# Patient Record
Sex: FEMALE | Race: WHITE
[De-identification: names, ages, dates, MRNs, and addresses within clinical notes are randomized per-mention and may not be internally consistent; named-entity substitution may affect disease eponyms.]

---
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
## 2018-05-05 ENCOUNTER — HOSPITAL ENCOUNTER (EMERGENCY)
Dept: HOSPITAL 47 - EC | Age: 18
Discharge: HOME | End: 2018-05-05
Payer: COMMERCIAL

## 2018-05-05 VITALS
HEART RATE: 97 BPM | TEMPERATURE: 98.8 F | DIASTOLIC BLOOD PRESSURE: 64 MMHG | SYSTOLIC BLOOD PRESSURE: 137 MMHG | RESPIRATION RATE: 20 BRPM

## 2018-05-05 DIAGNOSIS — N23: ICD-10-CM

## 2018-05-05 DIAGNOSIS — Z79.899: ICD-10-CM

## 2018-05-05 DIAGNOSIS — N20.1: Primary | ICD-10-CM

## 2018-05-05 LAB
ALBUMIN SERPL-MCNC: 4.4 G/DL (ref 3.5–5)
ALP SERPL-CCNC: 108 U/L (ref 38–126)
ALT SERPL-CCNC: 25 U/L (ref 9–52)
AMYLASE SERPL-CCNC: 41 U/L (ref 30–110)
ANION GAP SERPL CALC-SCNC: 16 MMOL/L
AST SERPL-CCNC: 26 U/L (ref 14–36)
BASOPHILS # BLD AUTO: 0.1 K/UL (ref 0–0.2)
BASOPHILS NFR BLD AUTO: 1 %
BUN SERPL-SCNC: 11 MG/DL (ref 7–17)
CALCIUM SPEC-MCNC: 9.7 MG/DL (ref 8.4–10.2)
CHLORIDE SERPL-SCNC: 104 MMOL/L (ref 98–107)
CO2 SERPL-SCNC: 23 MMOL/L (ref 22–30)
EOSINOPHIL # BLD AUTO: 0.1 K/UL (ref 0–0.7)
EOSINOPHIL NFR BLD AUTO: 1 %
ERYTHROCYTE [DISTWIDTH] IN BLOOD BY AUTOMATED COUNT: 5.21 M/UL (ref 3.8–5.4)
ERYTHROCYTE [DISTWIDTH] IN BLOOD: 12.2 % (ref 11.5–15.5)
GLUCOSE SERPL-MCNC: 119 MG/DL (ref 74–99)
HCT VFR BLD AUTO: 42.5 % (ref 34–46)
HGB BLD-MCNC: 14.5 GM/DL (ref 11.4–16)
KETONES UR QL STRIP.AUTO: (no result)
LIPASE SERPL-CCNC: 48 U/L (ref 23–300)
LYMPHOCYTES # SPEC AUTO: 1.6 K/UL (ref 1–4.8)
LYMPHOCYTES NFR SPEC AUTO: 12 %
MCH RBC QN AUTO: 27.8 PG (ref 25–35)
MCHC RBC AUTO-ENTMCNC: 34.1 G/DL (ref 31–37)
MCV RBC AUTO: 81.6 FL (ref 80–100)
MONOCYTES # BLD AUTO: 0.5 K/UL (ref 0–1)
MONOCYTES NFR BLD AUTO: 4 %
NEUTROPHILS # BLD AUTO: 11 K/UL (ref 1.3–7.7)
NEUTROPHILS NFR BLD AUTO: 82 %
PH UR: 6 [PH] (ref 5–8)
PLATELET # BLD AUTO: 296 K/UL (ref 150–450)
POTASSIUM SERPL-SCNC: 3.7 MMOL/L (ref 3.5–5.1)
PROT SERPL-MCNC: 6.9 G/DL (ref 6.3–8.2)
RBC UR QL: 139 /HPF (ref 0–5)
SODIUM SERPL-SCNC: 143 MMOL/L (ref 137–145)
SP GR UR: 1.02 (ref 1–1.03)
SQUAMOUS UR QL AUTO: 1 /HPF (ref 0–4)
UROBILINOGEN UR QL STRIP: 2 MG/DL (ref ?–2)
WBC # BLD AUTO: 13.5 K/UL (ref 4–11)
WBC #/AREA URNS HPF: 2 /HPF (ref 0–5)

## 2018-05-05 PROCEDURE — 81025 URINE PREGNANCY TEST: CPT

## 2018-05-05 PROCEDURE — 96375 TX/PRO/DX INJ NEW DRUG ADDON: CPT

## 2018-05-05 PROCEDURE — 96374 THER/PROPH/DIAG INJ IV PUSH: CPT

## 2018-05-05 PROCEDURE — 74018 RADEX ABDOMEN 1 VIEW: CPT

## 2018-05-05 PROCEDURE — 99284 EMERGENCY DEPT VISIT MOD MDM: CPT

## 2018-05-05 PROCEDURE — 80053 COMPREHEN METABOLIC PANEL: CPT

## 2018-05-05 PROCEDURE — 81001 URINALYSIS AUTO W/SCOPE: CPT

## 2018-05-05 PROCEDURE — 36415 COLL VENOUS BLD VENIPUNCTURE: CPT

## 2018-05-05 PROCEDURE — 83690 ASSAY OF LIPASE: CPT

## 2018-05-05 PROCEDURE — 85025 COMPLETE CBC W/AUTO DIFF WBC: CPT

## 2018-05-05 PROCEDURE — 96361 HYDRATE IV INFUSION ADD-ON: CPT

## 2018-05-05 PROCEDURE — 74176 CT ABD & PELVIS W/O CONTRAST: CPT

## 2018-05-05 PROCEDURE — 82150 ASSAY OF AMYLASE: CPT

## 2018-05-05 NOTE — CT
EXAMINATION TYPE: CT abdomen pelvis wo con

 

DATE OF EXAM: 5/5/2018

 

COMPARISON: NONE

 

HISTORY: Rt flank pain

 

CT DLP: 465.6 mGycm

Automated exposure control for dose reduction was used.

 

FINDINGS: There is some minimal atelectasis at the left lung base. There is no pleural pericardial fl
uid. The heart is not enlarged.

 

Within the abdomen, the liver is mildly prominent measuring 20 cm. 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 
lobe. The spleen and gallbladder are normal.

 

Both adrenal glands are normal.

 

There is a 2 mm calculus in the proximal right ureter with minimal fullness of the right renal pelvis
. There is a 1 to 2 mm nonobstructing calculus in the posterior middle pole calyx of the left kidney.


 

The pancreas is unremarkable.

 

There is no significant retroperitoneal adenopathy.

 

The bladder is normal.

 

The uterus and ovaries are unremarkable.

 

Both large and small bowel appear normal. The appendix is unremarkable.

 

There is no free fluid and no free air.

 

No bony lesion is seen.

 

IMPRESSION: 

1. 2 MM PARTIALLY OBSTRUCTING CALCULUS IN THE PROXIMAL RIGHT URETER.

2. MINIMAL NONOBSTRUCTING NEPHROLITHIASIS INVOLVING THE LEFT KIDNEY.

## 2018-05-05 NOTE — XR
EXAMINATION TYPE: XR KUB , 2 VIEWS

 

DATE OF EXAM ORDERED: 5/5/2018

 

HISTORY: abdominal pain.

 

COMPARISON: None.

 

FINDINGS:  The lung bases are clear.

 

The abdominal gas pattern is normal. There is no evidence of obstruction or free air. No unusual calc
ifications are seen.

 

IMPRESSION: 

 

NORMAL ABDOMEN.

## 2018-05-05 NOTE — ED
General Adult HPI





- General


Chief complaint: Abdominal Pain


Stated complaint: Abdominal Pain


Time Seen by Provider: 05/05/18 07:00


Source: patient, RN notes reviewed


Mode of arrival: ambulatory


Limitations: no limitations





- History of Present Illness


Initial comments: 





This is a 18-year-old female who presents to the emergency department 

complaining of right flank pain.  Patient states he woke up and the light is 

been severe since.  Patient states this made her vomit.  Patient states it's 

making her very restless because of the pain.  Patient denies any diarrhea.  

Patient denies any anterior abdominal pain.  Patient denies any right upper 

quadrant pain.  Patient denies any dysuria hematuria urinary frequency.  

Patient states she's recently had a.  She says weeks pregnant.  Patient denies 

any difficulty breathing shortness of breath.  Patient denies headache patient 

denies numbness weakness.  Patient denies any injury or trauma.





- Related Data


 Home Medications











 Medication  Instructions  Recorded  Confirmed


 


Cetirizine HCl 10 mg PO DAILY 05/05/18 05/05/18








 Previous Rx's











 Medication  Instructions  Recorded


 


Hydrocodone/Acetaminophen [Norco 1 each PO Q4HR PRN #15 tab 05/05/18





5-325]  


 


Ketorolac [Toradol] 10 mg PO Q6HR #15 tab 05/05/18











 Allergies











Allergy/AdvReac Type Severity Reaction Status Date / Time


 


No Known Allergies Allergy   Verified 10/24/14 14:49














Review of Systems


ROS Statement: 


Those systems with pertinent positive or pertinent negative responses have been 

documented in the HPI.





ROS Other: All systems not noted in ROS Statement are negative.





Past Medical History


Past Medical History: No Reported History


History of Any Multi-Drug Resistant Organisms: None Reported


Past Surgical History: Tonsillectomy


Additional Past Surgical History / Comment(s): pe tubes


Past Psychological History: No Psychological Hx Reported


Smoking Status: Never smoker


Past Alcohol Use History: Occasional


Past Drug Use History: None Reported





General Exam





- General Exam Comments


Initial Comments: 





GENERAL:


Patient is well-developed and well-nourished.  Patient is nontoxic and well-

hydrated and is in moderate distress.





ENT:


Neck is soft and supple.  No significant lymphadenopathy is noted.  Oropharynx 

is clear.  Moist mucous membranes.  Neck has full range of motion without 

eliciting any pain.  





EYES:


The sclera were anicteric and conjunctiva were pink and moist.  Extraocular 

movements were intact and pupils were equal round and reactive to light.  

Eyelids were unremarkable.





PULMONARY:


Unlabored respirations.  Good breath sounds bilaterally.  No audible rales 

rhonchi or wheezing was noted.





CARDIOVASCULAR:


There is a regular rate and rhythm without any murmurs gallops or rubs.  





ABDOMEN:


Soft and nontender with normal bowel sounds.  No palpable organomegaly was 

noted.  There is no palpable pulsatile mass.





SKIN:


Skin is clear with no lesions or rashes and otherwise unremarkable.





NEUROLOGIC:


Patient is alert and oriented x3.  Cranial nerves II through XII are grossly 

intact.  Motor and sensory are also intact.  Normal speech, volume and content.

  Symmetrical smile.  





MUSCULOSKELETAL:


Normal extremities with adequate strength and full range of motion.  Patient 

has some right CVA tenderness





LYMPHATICS:


No significant lymphadenopathy is noted





PSYCHIATRIC:


Normal psychiatric evaluation.  


Limitations: no limitations





Course


 Vital Signs











  05/05/18 05/05/18





  06:54 09:24


 


Temperature 97.3 F L 


 


Pulse Rate 81 70


 


Respiratory 20 18





Rate  


 


Blood Pressure 125/77 117/62


 


O2 Sat by Pulse 99 97





Oximetry  














Medical Decision Making





- Medical Decision Making





Computed tomography scan shows a 2 mm proximal ureteral stone.  Patient 

received Toradol in the emergency department and Zofran.





- Lab Data


Result diagrams: 


 05/05/18 08:18





 05/05/18 08:18


 Lab Results











  05/05/18 05/05/18 05/05/18 Range/Units





  08:18 08:18 08:18 


 


WBC   13.5 H   (4.0-11.0)  k/uL


 


RBC   5.21   (3.80-5.40)  m/uL


 


Hgb   14.5   (11.4-16.0)  gm/dL


 


Hct   42.5   (34.0-46.0)  %


 


MCV   81.6   (80.0-100.0)  fL


 


MCH   27.8   (25.0-35.0)  pg


 


MCHC   34.1   (31.0-37.0)  g/dL


 


RDW   12.2   (11.5-15.5)  %


 


Plt Count   296   (150-450)  k/uL


 


Neutrophils %   82   %


 


Lymphocytes %   12   %


 


Monocytes %   4   %


 


Eosinophils %   1   %


 


Basophils %   1   %


 


Neutrophils #   11.0 H   (1.3-7.7)  k/uL


 


Lymphocytes #   1.6   (1.0-4.8)  k/uL


 


Monocytes #   0.5   (0-1.0)  k/uL


 


Eosinophils #   0.1   (0-0.7)  k/uL


 


Basophils #   0.1   (0-0.2)  k/uL


 


Sodium  143    (137-145)  mmol/L


 


Potassium  3.7    (3.5-5.1)  mmol/L


 


Chloride  104    ()  mmol/L


 


Carbon Dioxide  23    (22-30)  mmol/L


 


Anion Gap  16    mmol/L


 


BUN  11    (7-17)  mg/dL


 


Creatinine  0.69    (0.52-1.04)  mg/dL


 


Est GFR (CKD-EPI)AfAm  >90    (>60 ml/min/1.73 sqM)  


 


Est GFR (CKD-EPI)NonAf  >90    (>60 ml/min/1.73 sqM)  


 


Glucose  119 H    (74-99)  mg/dL


 


Calcium  9.7    (8.4-10.2)  mg/dL


 


Total Bilirubin  0.6    (0.2-1.3)  mg/dL


 


AST  26    (14-36)  U/L


 


ALT  25    (9-52)  U/L


 


Alkaline Phosphatase  108    ()  U/L


 


Total Protein  6.9    (6.3-8.2)  g/dL


 


Albumin  4.4    (3.5-5.0)  g/dL


 


Amylase  41    ()  U/L


 


Lipase  48    ()  U/L


 


Urine Color     


 


Urine Appearance     (Clear)  


 


Urine pH     (5.0-8.0)  


 


Ur Specific Gravity     (1.001-1.035)  


 


Urine Protein     (Negative)  


 


Urine Glucose (UA)     (Negative)  


 


Urine Ketones     (Negative)  


 


Urine Blood     (Negative)  


 


Urine Nitrite     (Negative)  


 


Urine Bilirubin     (Negative)  


 


Urine Urobilinogen     (<2.0)  mg/dL


 


Ur Leukocyte Esterase     (Negative)  


 


Urine RBC     (0-5)  /hpf


 


Urine WBC     (0-5)  /hpf


 


Ur Squamous Epith Cells     (0-4)  /hpf


 


Urine Mucus     (None)  /hpf


 


Urine HCG, Qual    Not Detected  (Not Detectd)  














  05/05/18 Range/Units





  08:18 


 


WBC   (4.0-11.0)  k/uL


 


RBC   (3.80-5.40)  m/uL


 


Hgb   (11.4-16.0)  gm/dL


 


Hct   (34.0-46.0)  %


 


MCV   (80.0-100.0)  fL


 


MCH   (25.0-35.0)  pg


 


MCHC   (31.0-37.0)  g/dL


 


RDW   (11.5-15.5)  %


 


Plt Count   (150-450)  k/uL


 


Neutrophils %   %


 


Lymphocytes %   %


 


Monocytes %   %


 


Eosinophils %   %


 


Basophils %   %


 


Neutrophils #   (1.3-7.7)  k/uL


 


Lymphocytes #   (1.0-4.8)  k/uL


 


Monocytes #   (0-1.0)  k/uL


 


Eosinophils #   (0-0.7)  k/uL


 


Basophils #   (0-0.2)  k/uL


 


Sodium   (137-145)  mmol/L


 


Potassium   (3.5-5.1)  mmol/L


 


Chloride   ()  mmol/L


 


Carbon Dioxide   (22-30)  mmol/L


 


Anion Gap   mmol/L


 


BUN   (7-17)  mg/dL


 


Creatinine   (0.52-1.04)  mg/dL


 


Est GFR (CKD-EPI)AfAm   (>60 ml/min/1.73 sqM)  


 


Est GFR (CKD-EPI)NonAf   (>60 ml/min/1.73 sqM)  


 


Glucose   (74-99)  mg/dL


 


Calcium   (8.4-10.2)  mg/dL


 


Total Bilirubin   (0.2-1.3)  mg/dL


 


AST   (14-36)  U/L


 


ALT   (9-52)  U/L


 


Alkaline Phosphatase   ()  U/L


 


Total Protein   (6.3-8.2)  g/dL


 


Albumin   (3.5-5.0)  g/dL


 


Amylase   ()  U/L


 


Lipase   ()  U/L


 


Urine Color  Yellow  


 


Urine Appearance  Clear  (Clear)  


 


Urine pH  6.0  (5.0-8.0)  


 


Ur Specific Gravity  1.019  (1.001-1.035)  


 


Urine Protein  Trace H  (Negative)  


 


Urine Glucose (UA)  Negative  (Negative)  


 


Urine Ketones  3+ H  (Negative)  


 


Urine Blood  Moderate H  (Negative)  


 


Urine Nitrite  Negative  (Negative)  


 


Urine Bilirubin  Negative  (Negative)  


 


Urine Urobilinogen  2.0  (<2.0)  mg/dL


 


Ur Leukocyte Esterase  Negative  (Negative)  


 


Urine RBC  139 H  (0-5)  /hpf


 


Urine WBC  2  (0-5)  /hpf


 


Ur Squamous Epith Cells  1  (0-4)  /hpf


 


Urine Mucus  Occasional H  (None)  /hpf


 


Urine HCG, Qual   (Not Detectd)  














Disposition


Clinical Impression: 


 Renal colic





Disposition: HOME SELF-CARE


Condition: Good


Instructions:  Renal Colic (ED), Kidney Stones (ED)


Prescriptions: 


Hydrocodone/Acetaminophen [Norco 5-325] 1 each PO Q4HR PRN #15 tab


 PRN Reason: Pain


Ketorolac [Toradol] 10 mg PO Q6HR #15 tab


Is patient prescribed a controlled substance at d/c from ED?: Yes


When asked, does pt state using other controlled substances?: No


Referrals: 


Addy Zambrano DO [Primary Care Provider] - 1-2 days


Time of Disposition: 09:31

## 2019-01-17 ENCOUNTER — HOSPITAL ENCOUNTER (OUTPATIENT)
Dept: HOSPITAL 47 - RADCTMAIN | Age: 19
Discharge: HOME | End: 2019-01-17
Attending: FAMILY MEDICINE
Payer: COMMERCIAL

## 2019-01-17 DIAGNOSIS — R51: Primary | ICD-10-CM

## 2019-01-17 DIAGNOSIS — S06.0X0A: ICD-10-CM

## 2019-01-17 PROCEDURE — 70450 CT HEAD/BRAIN W/O DYE: CPT

## 2019-01-17 NOTE — CT
EXAMINATION TYPE: CT brain wo con

 

DATE OF EXAM: 1/17/2019

 

COMPARISON: Prior head CT 11/2/2017

 

HISTORY: Headaches x2 weeks after head injury.

 

CT DLP: 1082 mGycm.  Automated Exposure Control for Dose Reduction was Utilized.

 

 

TECHNIQUE: CT scan of the head is performed without contrast.

 

FINDINGS:   There is no acute intracranial hemorrhage, mass effect, or midline shift identified.  The
 ventricles and sulci are within normal limits in size.  The globes are intact and the visualized sin
uses are remarkable for minimal inflammatory change left maxillary sinus. 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
erebellar tonsillar ectopia.

 

IMPRESSION:  No acute intracranial hemorrhage, mass effect, or midline shift is seen. Additional find
ings above, inferior cerebellar tonsillar ectopia could be confirmed with brain MRI as indicated.

## 2019-09-14 ENCOUNTER — HOSPITAL ENCOUNTER (EMERGENCY)
Dept: HOSPITAL 47 - EC | Age: 19
Discharge: HOME | End: 2019-09-14
Payer: COMMERCIAL

## 2019-09-14 VITALS — TEMPERATURE: 98.6 F | HEART RATE: 60 BPM | DIASTOLIC BLOOD PRESSURE: 65 MMHG | SYSTOLIC BLOOD PRESSURE: 119 MMHG

## 2019-09-14 VITALS — RESPIRATION RATE: 18 BRPM

## 2019-09-14 DIAGNOSIS — Z79.899: ICD-10-CM

## 2019-09-14 DIAGNOSIS — Z79.3: ICD-10-CM

## 2019-09-14 DIAGNOSIS — D72.829: ICD-10-CM

## 2019-09-14 DIAGNOSIS — F32.9: ICD-10-CM

## 2019-09-14 DIAGNOSIS — Z87.442: ICD-10-CM

## 2019-09-14 DIAGNOSIS — R11.0: ICD-10-CM

## 2019-09-14 DIAGNOSIS — F17.200: ICD-10-CM

## 2019-09-14 DIAGNOSIS — R10.9: Primary | ICD-10-CM

## 2019-09-14 DIAGNOSIS — F41.9: ICD-10-CM

## 2019-09-14 LAB
ALBUMIN SERPL-MCNC: 4.3 G/DL (ref 3.5–5)
ALP SERPL-CCNC: 77 U/L (ref 38–126)
ALT SERPL-CCNC: 25 U/L (ref 9–52)
ANION GAP SERPL CALC-SCNC: 11 MMOL/L
AST SERPL-CCNC: 25 U/L (ref 14–36)
BASOPHILS # BLD AUTO: 0.1 K/UL (ref 0–0.2)
BASOPHILS NFR BLD AUTO: 0 %
BUN SERPL-SCNC: 7 MG/DL (ref 7–17)
CALCIUM SPEC-MCNC: 9.3 MG/DL (ref 8.4–10.2)
CHLORIDE SERPL-SCNC: 107 MMOL/L (ref 98–107)
CO2 SERPL-SCNC: 25 MMOL/L (ref 22–30)
EOSINOPHIL # BLD AUTO: 0.2 K/UL (ref 0–0.7)
EOSINOPHIL NFR BLD AUTO: 2 %
ERYTHROCYTE [DISTWIDTH] IN BLOOD BY AUTOMATED COUNT: 4.85 M/UL (ref 3.8–5.4)
ERYTHROCYTE [DISTWIDTH] IN BLOOD: 14.3 % (ref 11.5–15.5)
GLUCOSE SERPL-MCNC: 90 MG/DL (ref 74–99)
HCG SERPL-MCNC: <2.4 MIU/ML
HCT VFR BLD AUTO: 41.3 % (ref 34–46)
HGB BLD-MCNC: 14.1 GM/DL (ref 11.4–16)
LYMPHOCYTES # SPEC AUTO: 2.4 K/UL (ref 1–4.8)
LYMPHOCYTES NFR SPEC AUTO: 17 %
MCH RBC QN AUTO: 29.2 PG (ref 25–35)
MCHC RBC AUTO-ENTMCNC: 34.3 G/DL (ref 31–37)
MCV RBC AUTO: 85.2 FL (ref 80–100)
MONOCYTES # BLD AUTO: 0.5 K/UL (ref 0–1)
MONOCYTES NFR BLD AUTO: 4 %
NEUTROPHILS # BLD AUTO: 10.4 K/UL (ref 1.3–7.7)
NEUTROPHILS NFR BLD AUTO: 75 %
PH UR: 6 [PH] (ref 5–8)
PLATELET # BLD AUTO: 296 K/UL (ref 150–450)
POTASSIUM SERPL-SCNC: 3.8 MMOL/L (ref 3.5–5.1)
PROT SERPL-MCNC: 7.3 G/DL (ref 6.3–8.2)
PROT UR QL: (no result)
RBC UR QL: >182 /HPF (ref 0–5)
SODIUM SERPL-SCNC: 143 MMOL/L (ref 137–145)
SP GR UR: 1.01 (ref 1–1.03)
SQUAMOUS UR QL AUTO: 1 /HPF (ref 0–4)
UROBILINOGEN UR QL STRIP: <2 MG/DL (ref ?–2)
WBC # BLD AUTO: 13.8 K/UL (ref 3.8–10.6)
WBC # UR AUTO: 165 /HPF (ref 0–5)

## 2019-09-14 PROCEDURE — 96375 TX/PRO/DX INJ NEW DRUG ADDON: CPT

## 2019-09-14 PROCEDURE — 96374 THER/PROPH/DIAG INJ IV PUSH: CPT

## 2019-09-14 PROCEDURE — 76770 US EXAM ABDO BACK WALL COMP: CPT

## 2019-09-14 PROCEDURE — 96361 HYDRATE IV INFUSION ADD-ON: CPT

## 2019-09-14 PROCEDURE — 85025 COMPLETE CBC W/AUTO DIFF WBC: CPT

## 2019-09-14 PROCEDURE — 36415 COLL VENOUS BLD VENIPUNCTURE: CPT

## 2019-09-14 PROCEDURE — 81001 URINALYSIS AUTO W/SCOPE: CPT

## 2019-09-14 PROCEDURE — 99284 EMERGENCY DEPT VISIT MOD MDM: CPT

## 2019-09-14 PROCEDURE — 83690 ASSAY OF LIPASE: CPT

## 2019-09-14 PROCEDURE — 87077 CULTURE AEROBIC IDENTIFY: CPT

## 2019-09-14 PROCEDURE — 87186 SC STD MICRODIL/AGAR DIL: CPT

## 2019-09-14 PROCEDURE — 80053 COMPREHEN METABOLIC PANEL: CPT

## 2019-09-14 PROCEDURE — 74018 RADEX ABDOMEN 1 VIEW: CPT

## 2019-09-14 PROCEDURE — 84702 CHORIONIC GONADOTROPIN TEST: CPT

## 2019-09-14 PROCEDURE — 87086 URINE CULTURE/COLONY COUNT: CPT

## 2019-09-14 NOTE — US
EXAMINATION TYPE: US kidneys/renal and bladder

 

DATE OF EXAM: 9/14/2019

 

COMPARISON: NONE

 

CLINICAL HISTORY: Left flank pain. Left flank pain.

 

EXAM MEASUREMENTS:

 

Right Kidney:  9.5 x 3.7 x 3.7  cm

Left Kidney: 11.3 x 5.9 x 5.2  cm

 

Right Kidney: No hydronephrosis or masses seen  

Left Kidney: 3 mm echogenic area seen mid pole.  

Bladder: Not full.

**Bilateral Jets seen: No

 

There is no evidence for hydronephrosis at this point in time.  

 

IMPRESSION:

 

PROBABLE SMALL ANGIOMYOLIPOMA INVOLVING THE MID POLAR REGION OF THE LEFT KIDNEY.

## 2019-09-14 NOTE — ED
General Adult HPI





- General


Chief complaint: Back Pain/Injury


Stated complaint: back/abd pain


Time Seen by Provider: 09/14/19 07:21


Source: patient


Mode of arrival: ambulatory


Limitations: no limitations





- History of Present Illness


Initial comments: 





Dictation was produced using dragon dictation software. please excuse any 

grammatical, word or spelling errors. 





Chief Complaint: 19-year-old female with past medical history of nephrolithiasis

presents with left-sided flank pain.





History of Present Illness: Patient is a 19-year-old female she has past medical

history of nephrolithiasis.  Her first kidney stone was last year.  At that time

she had a 2 mm partially obstructing calculus in the proximal right ureter.  

Patient states that today she began having left-sided flank pain.  She initially

noted the pain while she was sleeping.  Patient states her pain initially 

started in the upper left back and does not radiate down to her lower left back.

 Patient states she is on her menstrual period as well.  She gets sometimes back

pain during her menstrual cycle.  Patient has history of nonobstructing left 

kidney stone seen last year on the CT.  Denies any fever, chills or night 

sweats.  Patient feels mildly nauseated.  She denies being pregnant.








The ROS documented in this emergency department record has been reviewed and 

confirmed by me.  Those systems with pertinent positive or negative responses 

have been documented in the HPI.  All other systems are other negative and/or 

noncontributory.








PHYSICAL EXAM:


General Impression: Alert and oriented x3, not in acute distress


HEENT: Normocephalic atraumatic, extra-ocular movements intact, pupils equal and

reactive to light bilaterally, mucous membranes moist.


Cardiovascular: Heart regular rate and rhythm, S1&S2 audible, no murmurs, rubs 

or gallops


Chest: Lungs clear to auscultation bilaterally, no rhonchi, no wheeze, no rales


Abdomen: Bowel sounds present, abdomen soft, non-tender, non-distended, no 

organomegaly


Musculoskeletal: Pulses present and equal in all extremities, no peripheral 

edema


Motor:  no focal deficits noted


Neurological: CN II-XII grossly intact, no focal motor or sensory deficits noted


Skin: Intact with no visualized rashes


Psych: Normal affect and mood





ED course: 19-year-old female presents with left-sided flank pain.  She has a 

history of kidney stone.  CT from last year showed nonobstructing left kidney 

stone measuring approximately 1-2 mm signs upon arrival are within acceptable 

limits.


Laboratory evaluation obtained.  Mild leukocytosis of 13.8, metabolic panel is 

negative.  Urinalysis shows greater that 182 red blood cells with 165 white 

blood cells.  KUB shows no acute processes.  Abdominal bladder ultrasound shows 

no signs of pyelonephritis.  Patient's symptoms are not entirely colicky.  There

is more concerned of bladder cystitis versus pyelonephritis.  Patient given 1 g 

of ceftriaxone.  Patient reevaluated at bedside found to be stable medical 

condition.  Discussed patient that her clinical presentation is concerning for 

urinary tract infection.  She is given prescription of Keflex to go home with.  

Patient clinically stable to be discharged per she is also given outpatient 

referral to urology for outpatient management of possible kidney stone.  He 

turned parameters discussed.  Patient clear for discharge.








- Related Data


                                Home Medications











 Medication  Instructions  Recorded  Confirmed


 


Cetirizine HCl 10 mg PO HS 05/05/18 09/14/19


 


ALPRAZolam [Xanax] 0.5 mg PO TID PRN 09/14/19 09/14/19


 


Escitalopram Oxalate [Lexapro] 20 mg PO HS 09/14/19 09/14/19


 


Myzilra 1 tab PO HS 09/14/19 09/14/19








                                  Previous Rx's











 Medication  Instructions  Recorded


 


Cephalexin [Keflex] 500 mg PO Q6HR 10 Days #40 cap 09/14/19











                                    Allergies











Allergy/AdvReac Type Severity Reaction Status Date / Time


 


No Known Allergies Allergy   Verified 09/14/19 07:48














Review of Systems


ROS Statement: 


Those systems with pertinent positive or pertinent negative responses have been 

documented in the HPI.





ROS Other: All systems not noted in ROS Statement are negative.





Past Medical History


Past Medical History: No Reported History


History of Any Multi-Drug Resistant Organisms: None Reported


Past Surgical History: Tonsillectomy


Additional Past Surgical History / Comment(s): pe tubes


Past Psychological History: Anxiety, Depression


Smoking Status: Current every day smoker


Past Alcohol Use History: Occasional


Past Drug Use History: Marijuana





General Exam


Limitations: no limitations





Course


                                   Vital Signs











  09/14/19 09/14/19





  07:16 08:34


 


Temperature 97.7 F 98.7 F


 


Pulse Rate 85 58 L


 


Respiratory 18 18





Rate  


 


Blood Pressure 138/99 113/85


 


O2 Sat by Pulse 97 97





Oximetry  














Medical Decision Making





- Lab Data


Result diagrams: 


                                 09/14/19 07:41





                                 09/14/19 07:41


                                   Lab Results











  09/14/19 09/14/19 09/14/19 Range/Units





  07:30 07:41 07:41 


 


WBC    13.8 H  (3.8-10.6)  k/uL


 


RBC    4.85  (3.80-5.40)  m/uL


 


Hgb    14.1  (11.4-16.0)  gm/dL


 


Hct    41.3  (34.0-46.0)  %


 


MCV    85.2  (80.0-100.0)  fL


 


MCH    29.2  (25.0-35.0)  pg


 


MCHC    34.3  (31.0-37.0)  g/dL


 


RDW    14.3  (11.5-15.5)  %


 


Plt Count    296  (150-450)  k/uL


 


Neutrophils %    75  %


 


Lymphocytes %    17  %


 


Monocytes %    4  %


 


Eosinophils %    2  %


 


Basophils %    0  %


 


Neutrophils #    10.4 H  (1.3-7.7)  k/uL


 


Lymphocytes #    2.4  (1.0-4.8)  k/uL


 


Monocytes #    0.5  (0-1.0)  k/uL


 


Eosinophils #    0.2  (0-0.7)  k/uL


 


Basophils #    0.1  (0-0.2)  k/uL


 


Sodium   143   (137-145)  mmol/L


 


Potassium   3.8   (3.5-5.1)  mmol/L


 


Chloride   107   ()  mmol/L


 


Carbon Dioxide   25   (22-30)  mmol/L


 


Anion Gap   11   mmol/L


 


BUN   7   (7-17)  mg/dL


 


Creatinine   0.70   (0.52-1.04)  mg/dL


 


Est GFR (CKD-EPI)AfAm   >90   (>60 ml/min/1.73 sqM)  


 


Est GFR (CKD-EPI)NonAf   >90   (>60 ml/min/1.73 sqM)  


 


Glucose   90   (74-99)  mg/dL


 


Calcium   9.3   (8.4-10.2)  mg/dL


 


Total Bilirubin   0.6   (0.2-1.3)  mg/dL


 


AST   25   (14-36)  U/L


 


ALT   25   (9-52)  U/L


 


Alkaline Phosphatase   77   ()  U/L


 


Total Protein   7.3   (6.3-8.2)  g/dL


 


Albumin   4.3   (3.5-5.0)  g/dL


 


Lipase   38   ()  U/L


 


HCG, Quant   <2.4   mIU/mL


 


Urine Color  Yellow    


 


Urine Appearance  Cloudy H    (Clear)  


 


Urine pH  6.0    (5.0-8.0)  


 


Ur Specific Gravity  1.015    (1.001-1.035)  


 


Urine Protein  1+ H    (Negative)  


 


Urine Glucose (UA)  Negative    (Negative)  


 


Urine Ketones  Negative    (Negative)  


 


Urine Blood  Moderate H    (Negative)  


 


Urine Nitrite  Negative    (Negative)  


 


Urine Bilirubin  Negative    (Negative)  


 


Urine Urobilinogen  <2.0    (<2.0)  mg/dL


 


Ur Leukocyte Esterase  Large H    (Negative)  


 


Urine RBC  >182 H    (0-5)  /hpf


 


Urine WBC  165 H    (0-5)  /hpf


 


Ur Squamous Epith Cells  1    (0-4)  /hpf


 


Urine Mucus  Rare H    (None)  /hpf














Disposition


Clinical Impression: 


 Flank pain





Disposition: HOME SELF-CARE


Condition: Good


Instructions (If sedation given, give patient instructions):  Urinary Tract 

Infection in Women (ED)


Prescriptions: 


Cephalexin [Keflex] 500 mg PO Q6HR 10 Days #40 cap


Is patient prescribed a controlled substance at d/c from ED?: No


Referrals: 


Addy Zambrano DO [Primary Care Provider] - 1-2 days


Ulices Villalobos MD [STAFF PHYSICIAN] - 1-2 days


Time of Disposition: 09:57

## 2019-09-14 NOTE — XR
EXAMINATION TYPE: XR KUB , 2 VIEWS

 

DATE OF EXAM ORDERED: 9/14/2019

 

HISTORY: abdominal pain.

 

COMPARISON: Previous study dated 5/5/2018.

 

FINDINGS:  The lung bases are clear.

 

Within the abdomen, the abdominal gas pattern is within normal limits. There is no evidence of obstru
ction or free air. No unusual calcifications are seen.

 

IMPRESSION: 

 

NO ACUTE INTRA-ABDOMINAL ABNORMALITY.

## 2020-07-30 ENCOUNTER — HOSPITAL ENCOUNTER (OUTPATIENT)
Dept: HOSPITAL 47 - RADUSWWP | Age: 20
Discharge: HOME | End: 2020-07-30
Attending: OBSTETRICS & GYNECOLOGY
Payer: COMMERCIAL

## 2020-07-30 DIAGNOSIS — R10.2: Primary | ICD-10-CM

## 2020-07-30 PROCEDURE — 76856 US EXAM PELVIC COMPLETE: CPT

## 2020-07-31 NOTE — US
EXAMINATION TYPE: US pelvic complete

 

DATE OF EXAM: 7/30/2020

 

COMPARISON: NONE

 

CLINICAL HISTORY: R10.2 PELVIC PAIN. Pain

 

TECHNIQUE:  Transabdominal (TA).  Transabdominal sonographic images of the pelvis were acquired.  

 

 

EXAM MEASUREMENTS:

 

Uterus:  8.2 x 3.5 x 4.1  cm

Endometrial Stripe: .4 cm

Right Ovary:  1.7 x 1.0 x .7  cm

Left Ovary:  1.4 x 1.0 x 1.0  cm

 

 

 

1. Uterus:  Anteverted   wnl

2. Endometrium:  wnl

3. Right Ovary:  wnl

4. Left Ovary:  wnl

5. Bilateral Adnexa:  wnl

6. Posterior cul-de-sac:  wnl

 

 

 

IMPRESSION:

No distinct abnormality appreciated at this time.

## 2020-08-07 ENCOUNTER — HOSPITAL ENCOUNTER (OUTPATIENT)
Dept: HOSPITAL 47 - RADCTMAIN | Age: 20
Discharge: HOME | End: 2020-08-07
Attending: FAMILY MEDICINE
Payer: COMMERCIAL

## 2020-08-07 DIAGNOSIS — N20.0: Primary | ICD-10-CM

## 2020-08-07 PROCEDURE — 74176 CT ABD & PELVIS W/O CONTRAST: CPT

## 2020-08-10 NOTE — CT
EXAMINATION TYPE: CT abdomen pelvis wo con

 

DATE OF EXAM: 8/7/2020

 

COMPARISON: 5/5/2018

 

HISTORY: Bilateral flank pain, more prevelant LT side and unspecified dyspareunia. Hx renal stones

 

CT DLP: 375.10 mGycm

Automated exposure control for dose reduction was used.

 

TECHNIQUE:  Helical acquisition of images was performed from the lung bases through the pelvis.

 

FINDINGS: 

 

LUNG BASES: No significant abnormality is appreciated.

 

LIVER/GB: No significant abnormality is appreciated.

 

PANCREAS: No significant abnormality is seen.

 

SPLEEN: No significant abnormality is seen.

 

ADRENALS: No significant abnormality is seen.

 

KIDNEYS: There is a 1 mm punctate calcification the upper pole the right kidney lower pole left kidne
y. No hydronephrosis.

 

ADENOPATHY:  None visualized.

 

OSSEOUS STRUCTURES:  No significant abnormality is seen.

 

BOWEL:  No significant abnormality is seen.

 

OTHER: Aorta of normal caliber. No free fluid or free air. Soft tissue granuloma in the right posteri
or gluteal region.

 

IMPRESSION: 

BILATERAL PUNCTATE 1 MM CALCULI WITH NO EVIDENCE OF HYDRONEPHROSIS.

## 2021-09-27 ENCOUNTER — HOSPITAL ENCOUNTER (OUTPATIENT)
Dept: HOSPITAL 47 - RADXRMAIN | Age: 21
Discharge: HOME | End: 2021-09-27
Attending: FAMILY MEDICINE
Payer: COMMERCIAL

## 2021-09-27 DIAGNOSIS — R10.84: Primary | ICD-10-CM

## 2021-09-27 PROCEDURE — 74018 RADEX ABDOMEN 1 VIEW: CPT

## 2021-09-28 NOTE — XR
Abdomen

 

HISTORY: Reflux, R 10.84

 

Frontal view of the abdomen on 2 images correlated to prior exam KUB dated 9/14/2019, CT 8/7/2020

 

No pathologic calcification is seen, punctate calcification seen on prior CT not well seen on plain f
ilm. No evident bowel obstruction or pneumoperitoneum. Bone mineralization is normal. Lung bases are 
clear. There are overlying artifacts.

 

IMPRESSION: No acute abnormalities evident

## 2025-01-11 ENCOUNTER — HOSPITAL ENCOUNTER (EMERGENCY)
Dept: HOSPITAL 47 - EC | Age: 25
Discharge: HOME | End: 2025-01-11
Payer: COMMERCIAL

## 2025-01-11 VITALS — HEART RATE: 60 BPM | DIASTOLIC BLOOD PRESSURE: 89 MMHG | SYSTOLIC BLOOD PRESSURE: 117 MMHG

## 2025-01-11 VITALS — RESPIRATION RATE: 18 BRPM | TEMPERATURE: 97.2 F

## 2025-01-11 DIAGNOSIS — R10.33: Primary | ICD-10-CM

## 2025-01-11 DIAGNOSIS — Z87.891: ICD-10-CM

## 2025-01-11 LAB
ALBUMIN SERPL-MCNC: 4.6 G/DL (ref 3.5–5)
ALP SERPL-CCNC: 114 U/L (ref 38–126)
ALT SERPL-CCNC: 26 U/L (ref 4–34)
AMYLASE SERPL-CCNC: 37 U/L (ref 30–110)
ANION GAP SERPL CALC-SCNC: 13 MMOL/L
AST SERPL-CCNC: 26 U/L (ref 14–36)
BASOPHILS # BLD AUTO: 0.1 K/UL (ref 0–0.2)
BASOPHILS NFR BLD AUTO: 0 %
BUN SERPL-SCNC: 7 MG/DL (ref 7–17)
CALCIUM SPEC-MCNC: 9.8 MG/DL (ref 8.4–10.2)
CHLORIDE SERPL-SCNC: 106 MMOL/L (ref 98–107)
CO2 SERPL-SCNC: 21 MMOL/L (ref 22–30)
EOSINOPHIL # BLD AUTO: 0.1 K/UL (ref 0–0.7)
EOSINOPHIL NFR BLD AUTO: 0 %
ERYTHROCYTE [DISTWIDTH] IN BLOOD BY AUTOMATED COUNT: 5.04 M/UL (ref 3.8–5.4)
ERYTHROCYTE [DISTWIDTH] IN BLOOD: 12.1 % (ref 11.5–15.5)
GLUCOSE SERPL-MCNC: 131 MG/DL (ref 74–99)
HCT VFR BLD AUTO: 43 % (ref 34–46)
HGB BLD-MCNC: 14.5 GM/DL (ref 11.4–16)
KETONES UR QL STRIP.AUTO: (no result)
LIPASE SERPL-CCNC: 43 U/L (ref 23–300)
LYMPHOCYTES # SPEC AUTO: 1 K/UL (ref 1–4.8)
LYMPHOCYTES NFR SPEC AUTO: 5 %
MCH RBC QN AUTO: 28.8 PG (ref 25–35)
MCHC RBC AUTO-ENTMCNC: 33.7 G/DL (ref 31–37)
MCV RBC AUTO: 85.5 FL (ref 80–100)
MONOCYTES # BLD AUTO: 0.6 K/UL (ref 0–1)
MONOCYTES NFR BLD AUTO: 3 %
NEUTROPHILS # BLD AUTO: 19.8 K/UL (ref 1.3–7.7)
NEUTROPHILS NFR BLD AUTO: 92 %
PH UR: 8.5 [PH] (ref 5–8)
PLATELET # BLD AUTO: 252 K/UL (ref 150–450)
POTASSIUM SERPL-SCNC: 3.8 MMOL/L (ref 3.5–5.1)
PROT SERPL-MCNC: 7.1 G/DL (ref 6.3–8.2)
SODIUM SERPL-SCNC: 140 MMOL/L (ref 137–145)
SP GR UR: 1.02 (ref 1–1.03)
SQUAMOUS UR QL AUTO: 13 /HPF (ref 0–4)
UROBILINOGEN UR QL STRIP: <2 MG/DL (ref ?–2)
WBC # BLD AUTO: 21.6 K/UL (ref 3.8–10.6)
WBC # UR AUTO: 4 /HPF (ref 0–5)

## 2025-01-11 PROCEDURE — 80053 COMPREHEN METABOLIC PANEL: CPT

## 2025-01-11 PROCEDURE — 96361 HYDRATE IV INFUSION ADD-ON: CPT

## 2025-01-11 PROCEDURE — 83690 ASSAY OF LIPASE: CPT

## 2025-01-11 PROCEDURE — 83605 ASSAY OF LACTIC ACID: CPT

## 2025-01-11 PROCEDURE — 76856 US EXAM PELVIC COMPLETE: CPT

## 2025-01-11 PROCEDURE — 96374 THER/PROPH/DIAG INJ IV PUSH: CPT

## 2025-01-11 PROCEDURE — 96376 TX/PRO/DX INJ SAME DRUG ADON: CPT

## 2025-01-11 PROCEDURE — 76830 TRANSVAGINAL US NON-OB: CPT

## 2025-01-11 PROCEDURE — 99284 EMERGENCY DEPT VISIT MOD MDM: CPT

## 2025-01-11 PROCEDURE — 93975 VASCULAR STUDY: CPT

## 2025-01-11 PROCEDURE — 36415 COLL VENOUS BLD VENIPUNCTURE: CPT

## 2025-01-11 PROCEDURE — 96375 TX/PRO/DX INJ NEW DRUG ADDON: CPT

## 2025-01-11 PROCEDURE — 84702 CHORIONIC GONADOTROPIN TEST: CPT

## 2025-01-11 PROCEDURE — 81001 URINALYSIS AUTO W/SCOPE: CPT

## 2025-01-11 PROCEDURE — 82150 ASSAY OF AMYLASE: CPT

## 2025-01-11 PROCEDURE — 85025 COMPLETE CBC W/AUTO DIFF WBC: CPT

## 2025-01-11 RX ADMIN — LIDOCAINE HYDROCHLORIDE ONE ML: 20 SOLUTION ORAL; TOPICAL at 14:47

## 2025-01-11 RX ADMIN — KETOROLAC TROMETHAMINE STA MG: 15 INJECTION, SOLUTION INTRAMUSCULAR; INTRAVENOUS at 10:43

## 2025-01-11 RX ADMIN — ONDANSETRON STA MG: 2 INJECTION INTRAMUSCULAR; INTRAVENOUS at 10:44

## 2025-01-11 RX ADMIN — ACETAMINOPHEN STA ML: 160 SOLUTION ORAL at 14:48

## 2025-01-11 RX ADMIN — CEFAZOLIN STA MLS/HR: 330 INJECTION, POWDER, FOR SOLUTION INTRAMUSCULAR; INTRAVENOUS at 10:42

## 2025-01-11 RX ADMIN — KETOROLAC TROMETHAMINE STA MG: 15 INJECTION, SOLUTION INTRAMUSCULAR; INTRAVENOUS at 12:33

## 2025-01-11 NOTE — US
EXAMINATION TYPE: US pelvis complete transvag

 

DATE OF EXAM: 1/11/2025

 

COMPARISON: NONE

 

CLINICAL INDICATION: Female, 24 years old with history of pain; Pt had a miscarriage on 11/19/24. Pt 
bled for a month straight, but is still testing positive. Pt states she had a TV US [facility] yesterday and they told her she had RPOC.  

 

TECHNIQUE:  Transvaginal (TV) and Transabdominal (TA) .  

 

Doppler imaging: Color Doppler Images were obtained.

Spectral doppler images were obtained.

 

FINDINGS:

 

Date of LMP:  October 2024

 

EXAM MEASUREMENTS:

 

Uterus:  7.0 x 5.3 x 4.6 cm

Endometrial Stripe: 1.7 cm

Right Ovary:  3.5 x 2.2 x 2.3 cm

Left Ovary:  2.9 x 2.5 x 1.8 cm

 

 

 

1. Uterus:  Anteverted   wnl

2. Endometrium:  heterogeneous and thickened. Severe hypervascularity consistent with RPOC.  

3. Right Ovary:  multiple follicles seen

4. Left Ovary:  wnl

**Spectral, color and waveform doppler imaging shows good arterial and venous flow within the ovaries
; there is no evidence for ovarian torsion.

5. Bilateral Adnexa:  wnl

6. Posterior cul-de-sac:  trace amount of free fluid

 

hCG is 21 and WBC is elevated.

 

IMPRESSION: 

1. No acute ultrasound abnormality identified.

2. Ectopic pregnancy and intrauterine pregnancy not excluded at this early low beta-hCG level. A foll
ow-up beta hCG and imaging recommended.

 

 

[facility], Workstation: [id number], 1/11/2025 12:52 PM

## 2025-01-11 NOTE — ED
General Adult HPI





- General


Chief complaint: Abdominal Pain


Stated complaint: abd pain


Time Seen by Provider: 01/11/25 10:06


Source: patient, RN notes reviewed


Mode of arrival: ambulatory


Limitations: no limitations





- History of Present Illness


Initial comments: 





24-year-old female presents to the emergency department for evaluation of 

abdominal pain.  Patient reports that the pain started around 3 AM.  She notes 

that it started under her rib cage and is now in the periumbilical region.  She 

admits to nausea and vomiting.  She states that she is having normal bowel 

movements.  She denies any urinary symptoms.  Denies any flank pain.  She 

reports a history of kidney stones but states that this feels different.  Denies

any prior abdominal surgeries.  Denies any known fevers at home.  Upon further 

questioning, patient reports that she recently had a miscarriage at the 

beginning of November.  She states that she had an ultrasound performed yester

day and there was debris still within the uterus.  She reports that she 

continues to have positive urine pregnancy test.





- Related Data


                                Home Medications











 Medication  Instructions  Recorded  Confirmed


 


Cetirizine HCl 10 mg PO HS 05/05/18 09/14/19


 


ALPRAZolam [Xanax] 0.5 mg PO TID PRN 09/14/19 09/14/19


 


Escitalopram Oxalate [Lexapro] 20 mg PO HS 09/14/19 09/14/19


 


Myzilra 1 tab PO HS 09/14/19 09/14/19








                                  Previous Rx's











 Medication  Instructions  Recorded


 


Cephalexin [Keflex] 500 mg PO Q6HR 10 Days #40 cap 09/14/19


 


Metoclopramide [Reglan] 10 mg PO TID PRN #15 tab 01/11/25


 


Omeprazole [PriLOSEC] 20 mg PO HS #14 cap 01/11/25











                                    Allergies











Allergy/AdvReac Type Severity Reaction Status Date / Time


 


No Known Allergies Allergy   Verified 01/11/25 09:47














Review of Systems


ROS Statement: 


Those systems with pertinent positive or pertinent negative responses have been 

documented in the HPI.





ROS Other: All systems not noted in ROS Statement are negative.





Past Medical History


Past Medical History: No Reported History


Additional Past Medical History / Comment(s): kidney stones


History of Any Multi-Drug Resistant Organisms: None Reported


Past Surgical History: Tonsillectomy


Additional Past Surgical History / Comment(s): pe tubes


Past Psychological History: Anxiety, Depression


Smoking Status: Former smoker


Past Alcohol Use History: Occasional


Past Drug Use History: Marijuana





General Exam


Limitations: no limitations


General appearance: alert, in no apparent distress


Head exam: Present: atraumatic, normocephalic, normal inspection


Eye exam: Present: normal appearance, PERRL, EOMI.  Absent: scleral icterus, 

conjunctival injection, periorbital swelling


ENT exam: Present: normal exam, mucous membranes moist


Neck exam: Present: normal inspection.  Absent: tenderness, meningismus, 

lymphadenopathy


Respiratory exam: Present: normal lung sounds bilaterally.  Absent: respiratory 

distress, wheezes, rales, rhonchi, stridor


Cardiovascular Exam: Present: regular rate, normal rhythm, normal heart sounds. 

 Absent: systolic murmur, diastolic murmur, rubs, gallop, clicks


GI/Abdominal exam: Present: soft, tenderness, normal bowel sounds.  Absent: 

distended, guarding, rebound, rigid


External exam: Present: normal external exam


Speculum exam: Present: vaginal discharge


By manual exam: Present: normal by manual exam


Extremities exam: Present: normal inspection, full ROM, normal capillary refill.

  Absent: tenderness, pedal edema, joint swelling, calf tenderness


Back exam: Present: normal inspection


Neurological exam: Present: alert, oriented X3


Psychiatric exam: Present: normal affect, normal mood


Skin exam: Present: warm, dry, intact, normal color.  Absent: rash





Course


                                   Vital Signs











  01/11/25 01/11/25





  09:48 12:33


 


Temperature 97.2 F L 


 


Pulse Rate 67 60


 


Respiratory 18 18





Rate  


 


Blood Pressure 132/87 117/89


 


O2 Sat by Pulse 100 98





Oximetry  














Medical Decision Making





- Medical Decision Making





Was pt. sent in by a medical professional or institution (, PA, NP, urgent 

care, hospital, or nursing home...) When possible be specific


@  -No


Did you speak to anyone other than the patient for history (EMS, parent, family,

 police, friend...)? What history was obtained from this source 


@  -No


Did you review nursing and triage notes (agree or disagree)?  Why? 


@  -I reviewed and agree with nursing and triage notes


Were old charts reviewed (outside hosp., previous admission, EMS record, old 

EKG, old radiological studies, urgent care reports/EKG's, nursing home records)?

 Report findings 


@  -No old charts were reviewed


Differential Diagnosis (chest pain, altered mental status, abdominal pain women,

 abdominal pain men, vaginal bleeding, weakness, fever, dyspnea, syncope, 

headache, dizziness, GI bleed, back pain, seizure, CVA, palpatations, mental 

health, musculoskeletal)? 


@  -Differential Abdominal Pain Women:


Appendicitis, Cholecystitis, diverticulosis, ischemic bowel, pancreatitis, 

hepatitis, UTI, gastroenteritis, AAA, incarcerated hernia, bowel obstruction, 

constipation, inflammatory bowel, hepatitis, peptic ulcer disease, splenic 

infarction, perforated viscus, vulvitis, ovarian torsion, PID, kidney stone, 

placenta abruption, this is not meant to be an all-inclusive list


EKG interpreted by me (3pts min.).


@  -None


X-rays interpreted by me (1pt min.).


@  -None done


CT interpreted by me (1pt min.).


@  -None done


U/S interpreted by me (1pt. min.).


@  -Transvaginal ultrasound Shows hypervascularity consistent with retained 

products of conception


What testing was considered but not performed or refused? (CT, X-rays, U/S, 

labs)? Why?


@  -CT considered


What meds were considered but not given or refused? Why?


@  -None


Did you discuss the management of the patient with other professionals 

(professionals i.e. , PA, NP, lab, RT, psych nurse, , , 

teacher, , )? Give summary


@  -I discussed the case with OB/GYN Dr. Walsh requesting the patient 

received a lab order for repeat hCG and CBC in 48 hours.


Was smoking cessation discussed for >3mins.?


@  -No


Was critical care preformed (if so, how long)?


@  -No


Were there social determinants of health that impacted care today? How? 

(Homelessness, low income, unemployed, alcoholism, drug addiction, 

transportation, low edu. Level, literacy, decrease access to med. care, half-way, 

rehab)?


@  -No


Was there de-escalation of care discussed even if they declined (Discuss DNR or 

withdrawal of care, Hospice)? DNR status


@  -No


What co-morbidities impacted this encounter? (DM, HTN, Smoking, COPD, CAD, 

Cancer, CVA, ARF, Chemo, Hep., AIDS, mental health diagnosis, sleep apnea, 

morbid obesity)?


@  -None


Was patient admitted / discharged? Hospital course, mention meds given and 

route, prescriptions, significant lab abnormalities, going to OR and other 

pertinent info.


@  -Patient presented emergency department for evaluation of abdominal pain.  

Laboratory studies obtained revealing leukocytosis at 21.6; CMP essentially un

remarkable, no significant lactic acidosis.  Normal LFTs, serum lipase 43.  

Patient had a positive quantitative hCG at 29.1.  Patient reports that she 

recently experienced a miscarriage and has continued to have positive pregnancy 

tests since then.  UA shows 4+ ketones, trace protein patient was provided IV 

fluids.  She had an ultrasound obtained yesterday revealing retained products of

 conception.  Ultrasound obtained today revealed similar findings.  I discussed 

the case with OB/GYN Dr. Walsh requesting the patient received a lab order 

for repeat hCG and CBC in 48 hours.  Patient was provided this and advised to 

follow-up outpatient with OB/GYN.  Case was discussed with Dr. Moody who also 

evaluated the patient.


Undiagnosed new problem with uncertain prognosis?


@  -No


Drug Therapy requiring intensive monitoring for toxicity (Heparin, Nitro, 

Insulin, Cardizem)?


@  -No


Were any procedures done?


@  -No


Diagnosis/symptom?


@  -Abdominal pain


Acute, or Chronic, or Acute on Chronic?


@  -Acute


Uncomplicated (without systemic symptoms) or Complicated (systemic symptoms)?


@  -Uncomplicated


Side effects of treatment?


@  -No


Exacerbation, Progression, or Severe Exacerbation?


@  -No


Poses a threat to life or bodily function? How? (Chest pain, USA, MI, pneumonia,

 PE, COPD, DKA, ARF, appy, cholecystitis, CVA, Diverticulitis, Homicidal, 

Suicidal, threat to staff... and all critical care pts)


@  -No








- Lab Data


Result diagrams: 


                                 01/11/25 10:41





                                 01/11/25 10:41


                                   Lab Results











  01/11/25 01/11/25 01/11/25 Range/Units





  10:41 10:41 10:41 


 


WBC  21.6 H    (3.8-10.6)  k/uL


 


RBC  5.04    (3.80-5.40)  m/uL


 


Hgb  14.5    (11.4-16.0)  gm/dL


 


Hct  43.0    (34.0-46.0)  %


 


MCV  85.5    (80.0-100.0)  fL


 


MCH  28.8    (25.0-35.0)  pg


 


MCHC  33.7    (31.0-37.0)  g/dL


 


RDW  12.1    (11.5-15.5)  %


 


Plt Count  252    (150-450)  k/uL


 


MPV  7.7    


 


Neutrophils %  92    %


 


Lymphocytes %  5    %


 


Monocytes %  3    %


 


Eosinophils %  0    %


 


Basophils %  0    %


 


Neutrophils #  19.8 H    (1.3-7.7)  k/uL


 


Lymphocytes #  1.0    (1.0-4.8)  k/uL


 


Monocytes #  0.6    (0-1.0)  k/uL


 


Eosinophils #  0.1    (0-0.7)  k/uL


 


Basophils #  0.1    (0-0.2)  k/uL


 


Sodium   140   (137-145)  mmol/L


 


Potassium   3.8   (3.5-5.1)  mmol/L


 


Chloride   106   ()  mmol/L


 


Carbon Dioxide   21 L   (22-30)  mmol/L


 


Anion Gap   13   mmol/L


 


BUN   7   (7-17)  mg/dL


 


Creatinine   0.56   (0.52-1.04)  mg/dL


 


Est GFR (CKD-EPI)AfAm   >90   (>60 ml/min/1.73 sqM)  


 


Est GFR (CKD-EPI)NonAf   >90   (>60 ml/min/1.73 sqM)  


 


Glucose   131 H   (74-99)  mg/dL


 


Plasma Lactic Acid Justin    1.5  (0.7-2.0)  mmol/L


 


Calcium   9.8   (8.4-10.2)  mg/dL


 


Total Bilirubin   0.5   (0.2-1.3)  mg/dL


 


AST   26   (14-36)  U/L


 


ALT   26   (4-34)  U/L


 


Alkaline Phosphatase   114   ()  U/L


 


Total Protein   7.1   (6.3-8.2)  g/dL


 


Albumin   4.6   (3.5-5.0)  g/dL


 


Amylase   37   ()  U/L


 


Lipase   43   ()  U/L


 


HCG, Quant     mIU/mL


 


Urine Color     


 


Urine Appearance     (Clear)  


 


Urine pH     (5.0-8.0)  


 


Ur Specific Gravity     (1.001-1.035)  


 


Urine Protein     (Negative)  


 


Urine Glucose (UA)     (Negative)  


 


Urine Ketones     (Negative)  


 


Urine Blood     (Negative)  


 


Urine Nitrite     (Negative)  


 


Urine Bilirubin     (Negative)  


 


Urine Urobilinogen     (<2.0)  mg/dL


 


Ur Leukocyte Esterase     (Negative)  


 


Urine WBC     (0-5)  /hpf


 


Ur Squamous Epith Cells     (0-4)  /hpf


 


Amorphous Sediment     (None)  /hpf


 


Urine Mucus     (None)  /hpf














  01/11/25 01/11/25 Range/Units





  10:41 11:36 


 


WBC    (3.8-10.6)  k/uL


 


RBC    (3.80-5.40)  m/uL


 


Hgb    (11.4-16.0)  gm/dL


 


Hct    (34.0-46.0)  %


 


MCV    (80.0-100.0)  fL


 


MCH    (25.0-35.0)  pg


 


MCHC    (31.0-37.0)  g/dL


 


RDW    (11.5-15.5)  %


 


Plt Count    (150-450)  k/uL


 


MPV    


 


Neutrophils %    %


 


Lymphocytes %    %


 


Monocytes %    %


 


Eosinophils %    %


 


Basophils %    %


 


Neutrophils #    (1.3-7.7)  k/uL


 


Lymphocytes #    (1.0-4.8)  k/uL


 


Monocytes #    (0-1.0)  k/uL


 


Eosinophils #    (0-0.7)  k/uL


 


Basophils #    (0-0.2)  k/uL


 


Sodium    (137-145)  mmol/L


 


Potassium    (3.5-5.1)  mmol/L


 


Chloride    ()  mmol/L


 


Carbon Dioxide    (22-30)  mmol/L


 


Anion Gap    mmol/L


 


BUN    (7-17)  mg/dL


 


Creatinine    (0.52-1.04)  mg/dL


 


Est GFR (CKD-EPI)AfAm    (>60 ml/min/1.73 sqM)  


 


Est GFR (CKD-EPI)NonAf    (>60 ml/min/1.73 sqM)  


 


Glucose    (74-99)  mg/dL


 


Plasma Lactic Acid Justin    (0.7-2.0)  mmol/L


 


Calcium    (8.4-10.2)  mg/dL


 


Total Bilirubin    (0.2-1.3)  mg/dL


 


AST    (14-36)  U/L


 


ALT    (4-34)  U/L


 


Alkaline Phosphatase    ()  U/L


 


Total Protein    (6.3-8.2)  g/dL


 


Albumin    (3.5-5.0)  g/dL


 


Amylase    ()  U/L


 


Lipase    ()  U/L


 


HCG, Quant  29.1   mIU/mL


 


Urine Color   Light Yellow  


 


Urine Appearance   Cloudy H  (Clear)  


 


Urine pH   8.5 H  (5.0-8.0)  


 


Ur Specific Gravity   1.020  (1.001-1.035)  


 


Urine Protein   Trace H  (Negative)  


 


Urine Glucose (UA)   Negative  (Negative)  


 


Urine Ketones   4+ H  (Negative)  


 


Urine Blood   Negative  (Negative)  


 


Urine Nitrite   Negative  (Negative)  


 


Urine Bilirubin   Negative  (Negative)  


 


Urine Urobilinogen   <2.0  (<2.0)  mg/dL


 


Ur Leukocyte Esterase   Small H  (Negative)  


 


Urine WBC   4  (0-5)  /hpf


 


Ur Squamous Epith Cells   13 H  (0-4)  /hpf


 


Amorphous Sediment   Occasional H  (None)  /hpf


 


Urine Mucus   Rare H  (None)  /hpf














Disposition


Clinical Impression: 


 Abdominal pain





Disposition: HOME SELF-CARE


Condition: Stable


Instructions (If sedation given, give patient instructions):  Abdominal Pain 

(ED)


Additional Instructions: 


Please follow up with OB/GYN and GI.  medication and take as prescribed. 

Return to the emergency department for new or worsening symptoms. 


Prescriptions: 


Omeprazole [PriLOSEC] 20 mg PO HS #14 cap


Metoclopramide [Reglan] 10 mg PO TID PRN #15 tab


 PRN Reason: Nausea


Is patient prescribed a controlled substance at d/c from ED?: No


Referrals: 


Addy aZmbrano DO [Primary Care Provider] - 1-2 days


Nahomi Montenegro MD [STAFF PHYSICIAN] - 1-2 days


Mary Muro DO [Doctor of Osteopathic Medicine] - 1-2 days


Marta Weathers MD [STAFF PHYSICIAN] - 1-2 days